# Patient Record
Sex: MALE | Race: ASIAN | NOT HISPANIC OR LATINO | URBAN - METROPOLITAN AREA
[De-identification: names, ages, dates, MRNs, and addresses within clinical notes are randomized per-mention and may not be internally consistent; named-entity substitution may affect disease eponyms.]

---

## 2017-03-13 ENCOUNTER — OUTPATIENT (OUTPATIENT)
Dept: OUTPATIENT SERVICES | Facility: HOSPITAL | Age: 82
LOS: 1 days | End: 2017-03-13
Payer: MEDICARE

## 2017-03-13 ENCOUNTER — APPOINTMENT (OUTPATIENT)
Dept: MRI IMAGING | Facility: IMAGING CENTER | Age: 82
End: 2017-03-13

## 2017-03-13 DIAGNOSIS — Z00.8 ENCOUNTER FOR OTHER GENERAL EXAMINATION: ICD-10-CM

## 2017-03-13 PROCEDURE — 70551 MRI BRAIN STEM W/O DYE: CPT

## 2017-03-17 ENCOUNTER — APPOINTMENT (OUTPATIENT)
Dept: MRI IMAGING | Facility: CLINIC | Age: 82
End: 2017-03-17

## 2019-12-11 ENCOUNTER — APPOINTMENT (OUTPATIENT)
Dept: SURGERY | Facility: CLINIC | Age: 84
End: 2019-12-11
Payer: COMMERCIAL

## 2019-12-11 VITALS
WEIGHT: 150 LBS | OXYGEN SATURATION: 95 % | TEMPERATURE: 98.3 F | BODY MASS INDEX: 24.99 KG/M2 | DIASTOLIC BLOOD PRESSURE: 53 MMHG | HEART RATE: 70 BPM | SYSTOLIC BLOOD PRESSURE: 165 MMHG | HEIGHT: 65 IN | RESPIRATION RATE: 16 BRPM

## 2019-12-11 DIAGNOSIS — I10 ESSENTIAL (PRIMARY) HYPERTENSION: ICD-10-CM

## 2019-12-11 DIAGNOSIS — Z93.3 COLOSTOMY STATUS: ICD-10-CM

## 2019-12-11 DIAGNOSIS — K59.00 CONSTIPATION, UNSPECIFIED: ICD-10-CM

## 2019-12-11 PROCEDURE — 99213 OFFICE O/P EST LOW 20 MIN: CPT

## 2019-12-11 NOTE — ASSESSMENT
[FreeTextEntry1] : Daily MiraLax recommended for constipation. Patient's perineal wound has difficulty healing because the patient sits for prolonged periods of time.  The wound is clean. The patient will sit on soft cushions and shift side to side. Silvadene cream for wound care. Followup p.r.n.

## 2019-12-11 NOTE — PHYSICAL EXAM
[FreeTextEntry1] : Ostomy normal in appearance. Perineum with 1.5 cm granulating clean wound. Surrounding skin unremarkable.

## 2020-08-03 ENCOUNTER — APPOINTMENT (OUTPATIENT)
Dept: GERIATRICS | Facility: CLINIC | Age: 85
End: 2020-08-03
Payer: MEDICARE

## 2020-08-03 VITALS — DIASTOLIC BLOOD PRESSURE: 60 MMHG | SYSTOLIC BLOOD PRESSURE: 130 MMHG

## 2020-08-03 VITALS
HEIGHT: 66 IN | DIASTOLIC BLOOD PRESSURE: 70 MMHG | OXYGEN SATURATION: 94 % | RESPIRATION RATE: 16 BRPM | WEIGHT: 155 LBS | SYSTOLIC BLOOD PRESSURE: 120 MMHG | HEART RATE: 56 BPM | BODY MASS INDEX: 24.91 KG/M2 | TEMPERATURE: 98.2 F

## 2020-08-03 DIAGNOSIS — Z23 ENCOUNTER FOR IMMUNIZATION: ICD-10-CM

## 2020-08-03 DIAGNOSIS — H91.13 PRESBYCUSIS, BILATERAL: ICD-10-CM

## 2020-08-03 DIAGNOSIS — Z83.3 FAMILY HISTORY OF DIABETES MELLITUS: ICD-10-CM

## 2020-08-03 DIAGNOSIS — M15.9 POLYOSTEOARTHRITIS, UNSPECIFIED: ICD-10-CM

## 2020-08-03 PROCEDURE — 99205 OFFICE O/P NEW HI 60 MIN: CPT

## 2020-08-03 PROCEDURE — 90732 PPSV23 VACC 2 YRS+ SUBQ/IM: CPT

## 2020-08-03 PROCEDURE — G0009: CPT

## 2020-08-03 NOTE — ASSESSMENT
[Designated Healthcare Proxy] : Designated healthcare proxy [Discussed at today's visit] : Advance Directives Discussed at today's visit [Name: ___] : Health Care Proxy's Name: [unfilled]  [Relationship: ___] : Relationship: [unfilled] [Daily physical exercise] : daily physical exercise [Remove loose items] : remove loose rugs or any other loose items that could lead to tripping and falling [Medication Review] : reviewed medications that increase the risk of falls [Adequate lighting] : provide adequate lighting and use a night light [Physical Therapy referral] : Physical Therapy referral [Use recommended devices] : use recommended assistive devices [Daily physical exercise as tolerated] : Daily physical exercise as tolerated [Minimize falling] : use grab bars, anti- slip rugs, and proper shoes to minimize falling [Normal Weight (BMI <25)] : normal weight - BMI <25 [Social support] : social support [Living arrangements] : living arrangements [Vaccines] : vaccines [Pain Management] : pain management [Advance Directives] : advance directives [Lab Results] : lab results [Medication Management] : medication management [FreeTextEntry4] : Patient prefers his daughter to be emergency contact at this time (she is living with them)\par Two sons and daughter\par \par Reports he does "not want a tube" or aggressive lifesaving measures. Reviewed MOLST and HCP forms in detail. Patietnt would prefer to further review w/ his children w/ plan to complete at next visit.

## 2020-08-03 NOTE — PHYSICAL EXAM
[General Appearance - Alert] : alert [General Appearance - Well Nourished] : well nourished [General Appearance - In No Acute Distress] : in no acute distress [General Appearance - Well-Appearing] : healthy appearing [General Appearance - Well Developed] : well developed [Sclera] : the sclera and conjunctiva were normal [PERRL With Normal Accommodation] : pupils were equal in size, round, and reactive to light [Extraocular Movements] : extraocular movements were intact [Normal Oral Mucosa] : normal oral mucosa [No Oral Cyanosis] : no oral cyanosis [No Oral Pallor] : no oral pallor [Outer Ear] : the ears and nose were normal in appearance [Both Tympanic Membranes Were Examined] : both tympanic membranes were normal [Neck Appearance] : the appearance of the neck was normal [Oropharynx] : The oropharynx was normal [Thyroid Diffuse Enlargement] : the thyroid was not enlarged [Neck Cervical Mass (___cm)] : no neck mass was observed [Jugular Venous Distention Increased] : there was no jugular-venous distention [Thyroid Nodule] : there were no palpable thyroid nodules [Auscultation Breath Sounds / Voice Sounds] : lungs were clear to auscultation bilaterally [Heart Rate And Rhythm] : heart rate was normal and rhythm regular [Heart Sounds] : normal S1 and S2 [Murmurs] : no murmurs [Full Pulse] : the pedal pulses are present [Bowel Sounds] : normal bowel sounds [Abdomen Tenderness] : non-tender [Abdomen Mass (___ Cm)] : no abdominal mass palpated [Abdomen Soft] : soft [Cervical Lymph Nodes Enlarged Posterior Bilaterally] : posterior cervical [Cervical Lymph Nodes Enlarged Anterior Bilaterally] : anterior cervical [No CVA Tenderness] : no ~M costovertebral angle tenderness [Motor Tone] : muscle strength and tone were normal [] : no rash [Skin Color & Pigmentation] : normal skin color and pigmentation [Sensation] : the sensory exam was normal to light touch and pinprick [Deep Tendon Reflexes (DTR)] : deep tendon reflexes were 2+ and symmetric [No Focal Deficits] : no focal deficits [Oriented To Time, Place, And Person] : oriented to person, place, and time [Memory Recent] : recent memory was not impaired [Impaired Insight] : insight and judgment were intact [Affect] : the affect was normal [FreeTextEntry1] : No symptoms when moving from seated to standing position

## 2020-08-03 NOTE — REVIEW OF SYSTEMS
[Feeling Tired] : feeling tired [As noted in HPI] : as noted in HPI [Heart Rate Is Slow] : slow heart rate [Loss Of Hearing] : hearing loss [Lower Ext Edema] : lower extremity edema [As Noted in HPI] : as noted in HPI [Dizziness] : dizziness [Difficulty Walking] : difficulty walking [Negative] : Heme/Lymph [Chills] : no chills [Fever] : no fever [Palpitations] : no palpitations [Chest Pain] : no chest pain [Earache] : no earache [Confused] : no confusion [Leg Claudication] : no intermittent leg claudication [Convulsions] : no convulsions [Fainting] : no fainting [Limb Weakness] : no limb weakness [FreeTextEntry5] : Long-standing LE edema. No leg elevation

## 2020-08-03 NOTE — REASON FOR VISIT
[Initial Evaluation] : an initial evaluation [Family Member] : family member [Source: ________] : History obtained from [unfilled]

## 2020-08-03 NOTE — CURRENT MEDS
[Medication Reconciliation Completed] : Medication reconciliation completed [High Risk Medications Reviewed] : High risk medications reviewed [Patient/caregiver able to verbalize understanding of medications, including indications and side effects] : Patient/caregiver able to verbalize understanding of medications, including indications and side effects [ at pharmacy] :  at local pharmacy [de-identified] : [facility] [] : does not miss any medication doses [Reports Changes In Medication (including OTC)] : Patient reports no changes in medication

## 2020-08-03 NOTE — SOCIAL HISTORY
[No falls in past year] : Patient reported no falls in the past year [Walker] : walker [Anti-Slip Measures] : anti-slip measures [Shower Chair] : shower chair [FreeTextEntry4] : No reported cognitive deficits [FreeTextEntry1] : Has 24/7 live-in A [FreeTextEntry2] : Lives w/ spouse and currently daughter. Wife has severe dementia.  [Grab Bars] : no grab bars [Guns at Home] : no guns at home [de-identified] : Assistance w/ bathing, dressing, transferring. Independent in toileting, feeding, walks w/ rollator [de-identified] : Full assistance  [de-identified] : Last fall two years ago [de-identified] : Rollator [de-identified] : Has stairlift (lives on second floor)

## 2020-08-03 NOTE — HISTORY OF PRESENT ILLNESS
[0] : 2) Feeling down, depressed, or hopeless: Not at all [FreeTextEntry1] : 89 y/o man w/ PMH BPH, DM2, HTN, CKD III, hypothyroidism, presbycusis, hearing loss, rectal cancer (in remission) s/p proctectomy w/ colostomy presents for initial visit. \par \par PCP: Dr. Joey Julian\par \par Patient reports some "dizziness" and "light-headedness" like he is "going to pass out". The episodes usually last a couple of minutes and are precipitated by moving from seating to standing position. It improves after he sits. His BP is measured regularly and has been "slightly lower recently", with SBP as low as 80-90s in the AM. He typically notes these episodes in the AM. He does not take antihypertensive medications. Has not been checked for orthostatics. \par \par His granddaughter does note increased fatigue and decreased PO intake over the past few months. He seems to have "less energy" than usual. He's always been active, including doing [employer] dancing into his late 80s and working for the [employer] for many years. His wife is severely demented and dependant for all ADL/IADLs.\par \par HTN: Currently not on medications. BP range 90s-140s/40s-50s. HR typically 50s-60s. Is on flomax.\par \par OA: Noted R knee pain intermittently, R>L. Pain sometimes impacts ability to ambulate. He previously received steroid injections to R knee, last in 2019. It did help. Has not used tylenol. Also notes some pain in L wrist w/ occasional numbness in first three fingers. Used L wrist splint w/o much relief. Uses pregabalin 100 mg qd. Used meloxicam in the past w/ intermittent relief. \par \par DM2: Last HbA1c 5.7 7/14/20. F/S at home ~.\par \par Hypothyroidism: Remains on synthroid. Last TSH 3.46 from 10/2019. \par \par BPH: Remains on flomax, myrbetriq and finasteride. No recent issues.\par \par Colostomy: No recent issues, patient manages bag himself. Occasional decreased output. \par \par HCM: Patient is on ASA and simvastatin. No h/o stents. Reports ?prior stroke, granddaughter cannot verify and no imaging in EMR. Need to clarify.\par \par Vaccinations:\par Flu - Last year\par Pneumovax - Has not had\par Tdap - Cannot remember last dose\par Zoster - Unsure

## 2020-08-06 LAB
25(OH)D3 SERPL-MCNC: 42.8 NG/ML
ANION GAP SERPL CALC-SCNC: 15 MMOL/L
BASOPHILS # BLD AUTO: 0.01 K/UL
BASOPHILS NFR BLD AUTO: 0.2 %
BUN SERPL-MCNC: 24 MG/DL
CALCIUM SERPL-MCNC: 9.4 MG/DL
CHLORIDE SERPL-SCNC: 106 MMOL/L
CO2 SERPL-SCNC: 21 MMOL/L
CREAT SERPL-MCNC: 1.28 MG/DL
EOSINOPHIL # BLD AUTO: 0.06 K/UL
EOSINOPHIL NFR BLD AUTO: 1.2 %
GLUCOSE SERPL-MCNC: 142 MG/DL
HCT VFR BLD CALC: 36.9 %
HGB BLD-MCNC: 11.7 G/DL
IMM GRANULOCYTES NFR BLD AUTO: 0 %
LYMPHOCYTES # BLD AUTO: 1.62 K/UL
LYMPHOCYTES NFR BLD AUTO: 32.6 %
MAN DIFF?: NORMAL
MCHC RBC-ENTMCNC: 31.7 GM/DL
MCHC RBC-ENTMCNC: 32.6 PG
MCV RBC AUTO: 102.8 FL
MONOCYTES # BLD AUTO: 0.6 K/UL
MONOCYTES NFR BLD AUTO: 12.1 %
NEUTROPHILS # BLD AUTO: 2.68 K/UL
NEUTROPHILS NFR BLD AUTO: 53.9 %
PLATELET # BLD AUTO: 153 K/UL
POTASSIUM SERPL-SCNC: 4.7 MMOL/L
RBC # BLD: 3.59 M/UL
RBC # FLD: 15 %
SODIUM SERPL-SCNC: 143 MMOL/L
TSH SERPL-ACNC: 1.91 UIU/ML
VIT B12 SERPL-MCNC: >2000 PG/ML
WBC # FLD AUTO: 4.97 K/UL

## 2020-09-02 ENCOUNTER — APPOINTMENT (OUTPATIENT)
Dept: GERIATRICS | Facility: CLINIC | Age: 85
End: 2020-09-02
Payer: MEDICARE

## 2020-09-02 VITALS
RESPIRATION RATE: 16 BRPM | WEIGHT: 156 LBS | BODY MASS INDEX: 25.18 KG/M2 | SYSTOLIC BLOOD PRESSURE: 130 MMHG | HEART RATE: 64 BPM | DIASTOLIC BLOOD PRESSURE: 64 MMHG | OXYGEN SATURATION: 95 %

## 2020-09-02 DIAGNOSIS — G56.02 CARPAL TUNNEL SYNDROME, LEFT UPPER LIMB: ICD-10-CM

## 2020-09-02 DIAGNOSIS — Z92.29 PERSONAL HISTORY OF OTHER DRUG THERAPY: ICD-10-CM

## 2020-09-02 DIAGNOSIS — E53.8 DEFICIENCY OF OTHER SPECIFIED B GROUP VITAMINS: ICD-10-CM

## 2020-09-02 DIAGNOSIS — T14.8XXA OTHER INJURY OF UNSPECIFIED BODY REGION, INITIAL ENCOUNTER: ICD-10-CM

## 2020-09-02 PROCEDURE — 99215 OFFICE O/P EST HI 40 MIN: CPT

## 2020-09-02 PROCEDURE — 99497 ADVNCD CARE PLAN 30 MIN: CPT | Mod: 25

## 2020-09-02 RX ORDER — MIRABEGRON 25 MG/1
25 TABLET, FILM COATED, EXTENDED RELEASE ORAL
Refills: 0 | Status: ACTIVE | COMMUNITY

## 2020-09-02 RX ORDER — ASPIRIN 81 MG
81 TABLET, DELAYED RELEASE (ENTERIC COATED) ORAL DAILY
Refills: 1 | Status: ACTIVE | COMMUNITY

## 2020-09-02 RX ORDER — TAMSULOSIN HYDROCHLORIDE 0.4 MG/1
0.4 CAPSULE ORAL
Qty: 30 | Refills: 2 | Status: DISCONTINUED | COMMUNITY
Start: 2020-08-06 | End: 2020-09-02

## 2020-09-02 RX ORDER — PREGABALIN 50 MG/1
50 CAPSULE ORAL
Refills: 0 | Status: DISCONTINUED | COMMUNITY
End: 2020-09-02

## 2020-09-02 RX ORDER — FINASTERIDE 5 MG/1
5 TABLET, FILM COATED ORAL DAILY
Refills: 0 | Status: ACTIVE | COMMUNITY

## 2020-09-02 RX ORDER — REPAGLINIDE 1 MG/1
1 TABLET ORAL 3 TIMES DAILY
Refills: 0 | Status: DISCONTINUED | COMMUNITY
End: 2020-09-02

## 2020-09-02 NOTE — REVIEW OF SYSTEMS
[Fever] : no fever [Chills] : no chills [Earache] : no earache [Palpitations] : no palpitations [Chest Pain] : no chest pain [Leg Claudication] : no intermittent leg claudication [Confused] : no confusion [Convulsions] : no convulsions [Fainting] : no fainting [Limb Weakness] : no limb weakness [FreeTextEntry5] : Long-standing LE edema. No leg elevation

## 2020-09-02 NOTE — CURRENT MEDS
[Reports Changes In Medication (including OTC)] : Patient reports no changes in medication [] : does not miss any medication doses [de-identified] : CEDRIC

## 2020-09-02 NOTE — ASSESSMENT
[FreeTextEntry1] : RTC in one month for flu vaccine.  [FreeTextEntry4] : Completed HCP and MOLST with patient today in the office. Appointed Flory as his HCP w/ son (Quinn Mohamud - [phone]) and granddaughter (Danica Garcia - [phone]) as alternates.\par \par MOLST patient is DNR/DNI, no feeding tube, limited med interventions, would want to be hospitalized and given ABX if needed.\par \par Face-to-face advanced care planning time 20 minutes.

## 2020-09-02 NOTE — SOCIAL HISTORY
[FreeTextEntry1] : Has 24/7 live-in A [FreeTextEntry2] : Lives w/ spouse and currently daughter. Wife has severe dementia.  [FreeTextEntry4] : No reported cognitive deficits [Grab Bars] : no grab bars [Guns at Home] : no guns at home [de-identified] : Last fall two years ago [de-identified] : Assistance w/ bathing, dressing, transferring due to functional impairment. Independent in toileting, feeding, walks w/ rollator [de-identified] : Rollator [de-identified] : Has stairlift (lives on second floor)

## 2020-09-02 NOTE — DATA REVIEWED
[FreeTextEntry1] : MRI BRAIN (2017):\par \par Mild microvascular ischemic change. Small chronic left thalamic, right \par centrum semiovale lacunar infarctions.\par No intracranial hemorrhage, mass or hydrocephalus.\par

## 2020-09-02 NOTE — HISTORY OF PRESENT ILLNESS
[FreeTextEntry1] : 89 y/o man w/ PMH BPH, DM2, HTN, CKD III, hypothyroidism, presbycusis, OA, L carpal tunnel syndrome, rectal cancer (in remission) s/p proctectomy w/ colostomy, and prior lacunar CVA presents for f/u visit. \par \par Orthostatic Hypotension: Seen last visit, no objective orthostasis in the office but suspected at home w/ reported borderline BP. Flomax was briefly held and then decreased from 0.8 to 0.4 mg, but patient had recurrence of urinary hesitancy. Increased fluid intake and has been taking precautions when moving from seated to standing positions at home. No reported dizziness in the past month. BP measurements range 100s-140s/40s-70s.\par \par Dizziness: Improving. Reviewed TTE (7/2020) sent from Dr. Julian's office, EF 55-60% w/o significant valvular abnormalities. Recent Holter (7/2020) w/ intermittent sinus bradycardia w/ average HR 56 bpm (max 68) w/ 16 APCs but no evidence of pauses or arrhythmia. \par \par HTN: Currently not on medications.\par \par OA/Carpal Tunnel: Noted R knee pain intermittently, R>L. Pain sometimes impacts ability to ambulate. He previously received steroid injections to R knee, last in 2019. It did help. Also notes some pain in L wrist w/ occasional numbness in first three fingers. Uses L wrist splint w/o much relief. Uses pregabalin, cut to 50 mg on last visit. Encouraged use of tylenol, which he is using intermittently. Pregabalin continues to make him fatigued w/o significant relief. Not interested in surgical evaluation for carpal tunnel. Does not have weakness of his L hand.\par \par DM2: Last HbA1c 5.7 7/14/20. Lantus was d/c'ed last visit, remains on prandin (had been decreased from 2 to 1 mg) and Janumet. His F/S in the past month have ranged from 90s-170s. No hypoglycemia. Weight is stable.\par \par Hypothyroidism: Remains on synthroid alternating doses 50/75 mcg. Last TSH ~2 8/2020.\par \par BPH: Remains on flomax, myrbetriq and finasteride. Increased urinary hesitancy when flomax dose was held and on 0.4 mg qhs. Has been on 0.8 mg qhs last two weeks w/o dizziness.\par \par Colostomy: No recent issues, patient manages bag himself. \par \par Lacunar CVA: Incidental finding on MRI in 2017 w/ L thalamic chronic lacunar CVA. Remains on ASA and simvastatin. \par \par Macrocytic Anemia: H/H stable, Hgb steady ~11-12. CBC in 2012 noted w/ Hgb 10.8 w/ , similar to recent CBC.\par \par CKD III: Last Cr 1.28 8/2020, at baseline.\par \par HCM: Patient is on ASA and simvastatin. No h/o stents. \par \par Vaccinations:\par Flu - Last year\par Pneumovax - 8/2020\par Tdap - Cannot remember last dose\par Zoster - Unsure\par \par VS: 130/64, 64, 16, 95% on RA

## 2020-09-02 NOTE — REASON FOR VISIT
[Intercurrent Specialty/Sub-specialty Visits] : the patient has no intercurrent specialty/sub-specialty visits

## 2020-12-07 ENCOUNTER — APPOINTMENT (OUTPATIENT)
Dept: GERIATRICS | Facility: CLINIC | Age: 85
End: 2020-12-07
Payer: MEDICARE

## 2020-12-07 DIAGNOSIS — E03.9 HYPOTHYROIDISM, UNSPECIFIED: ICD-10-CM

## 2020-12-07 DIAGNOSIS — I95.1 ORTHOSTATIC HYPOTENSION: ICD-10-CM

## 2020-12-07 PROCEDURE — 99214 OFFICE O/P EST MOD 30 MIN: CPT | Mod: 95

## 2020-12-07 RX ORDER — TAMSULOSIN HYDROCHLORIDE 0.4 MG/1
0.4 CAPSULE ORAL
Qty: 30 | Refills: 2 | Status: ACTIVE | COMMUNITY
Start: 2020-12-07

## 2020-12-07 RX ORDER — METFORMIN HYDROCHLORIDE 1000 MG/1
1000 TABLET, COATED ORAL
Qty: 180 | Refills: 2 | Status: ACTIVE | COMMUNITY
Start: 2020-12-07 | End: 1900-01-01

## 2020-12-07 RX ORDER — TAMSULOSIN HYDROCHLORIDE 0.4 MG/1
0.4 CAPSULE ORAL
Qty: 30 | Refills: 0 | Status: DISCONTINUED | COMMUNITY
Start: 2020-09-02 | End: 2020-12-07

## 2020-12-07 RX ORDER — SITAGLIPTIN AND METFORMIN HYDROCHLORIDE 50; 500 MG/1; MG/1
50-500 TABLET, FILM COATED ORAL DAILY
Refills: 0 | Status: DISCONTINUED | COMMUNITY
End: 2020-12-07

## 2020-12-07 NOTE — PHYSICAL EXAM
[General Appearance - Alert] : alert [General Appearance - In No Acute Distress] : in no acute distress [General Appearance - Well-Appearing] : healthy appearing [] : no respiratory distress [Exaggerated Use Of Accessory Muscles For Inspiration] : no accessory muscle use [Oriented To Time, Place, And Person] : oriented to person, place, and time [Affect] : the affect was normal [Mood] : the mood was normal

## 2020-12-07 NOTE — HISTORY OF PRESENT ILLNESS
[Home] : at home, [unfilled] , at the time of the visit. [Medical Office: ([facility])___] : at the medical office located in  [Verbal consent obtained from patient] : the patient, [unfilled] [FreeTextEntry1] : 89 y/o man w/ PMH BPH, DM2, HTN, CKD III, hypothyroidism, presbycusis, OA, L carpal tunnel syndrome, rectal cancer (in remission) s/p proctectomy w/ colostomy, and prior lacunar CVA presents for f/u telemedicine visit. \par \par Endocrinology: Dr. Sheikh \par \par Orthostatic Hypotension: Seen last visit, no objective orthostasis in the office but suspected at home w/ reported borderline BP. Flomax was briefly held and then decreased from 0.8 to 0.4 mg, but patient had recurrence of urinary hesitancy. Increased fluid intake and has been taking precautions when moving from seated to standing positions at home. Urology decreased flomax back to 0.4 mg on recent visit. His urinary sx have actually been ok past month. BP measurements range 100s-140s/40s-70s.\par \par Dizziness: Mild improvement. Reviewed TTE (7/2020) sent from Dr. Julian's office, EF 55-60% w/o significant valvular abnormalities. Holter (7/2020) w/ intermittent sinus bradycardia w/ average HR 56 bpm (max 68) w/ 16 APCs but no evidence of pauses or arrhythmia. Last had an episode of dizziness 10//7/2020 was also a/w borderline hypotension. \par \par HTN: Currently not on medications. Previously on losartan.\par \par OA/Carpal Tunnel: Noted R knee pain intermittently, R>L. Pain sometimes impacts ability to ambulate. He previously received steroid injections to R knee, last in 2019. It did help. Also notes some pain in L wrist w/ occasional numbness in first three fingers. Now off pregabalin. Not interested in surgical evaluation for carpal tunnel. Does not have weakness of his L hand.\par \par DM2: Last HbA1c 5.7 7/14/20. Lantus was d/c'ed, prandin was d/c'ed on last visit. Remains Janumet. His F/S in the past month have ranged from 90s-170s. No hypoglycemia. Weight is stable. He saw an endocrinologist last month who started Acarbose TID. Daughter is concerned about pill burden. \par \par Hypothyroidism: Remains on synthroid alternating doses 50/75 mcg. Last TSH ~2 8/2020.\par \par BPH: Remains on flomax, myrbetriq and finasteride. Sx stable. \par \par Colostomy: No recent issues, patient manages bag w/ daughter. \par \par Lacunar CVA: Incidental finding on MRI in 2017 w/ L thalamic chronic lacunar CVA. Remains on ASA and simvastatin. \par \par Macrocytic Anemia: H/H stable, Hgb steady ~11-12. CBC in 2012 noted w/ Hgb 10.8 w/ , similar to recent CBC.\par \par CKD III: Last Cr 1.28 8/2020, at baseline.\par \par HCM: Patient is on ASA and simvastatin. No h/o stents. \par \par Vaccinations:\par Flu - Last year\par Pneumovax - 8/2020\par Tdap - Cannot remember last dose\par Zoster - Unsure

## 2020-12-07 NOTE — CURRENT MEDS
[Medication Reconciliation Completed] : Medication reconciliation completed [High Risk Medications Reviewed] : High risk medications reviewed [Patient/caregiver able to verbalize understanding of medications, including indications and side effects] : Patient/caregiver able to verbalize understanding of medications, including indications and side effects [ at pharmacy] :  at local pharmacy [Reports Changes In Medication (including OTC)] : Patient reports no changes in medication [] : does not miss any medication doses [de-identified] : CEDRIC

## 2020-12-07 NOTE — ASSESSMENT
[FreeTextEntry1] : Daughter inquired about "carotid ultrasound" for his occasional dizziness. Explained that he could have some evidence of carotid stenosis, but more likely that these episodes are due to orthostasis. He does have h/o TIA. Even if he had significant carotid stenosis, patient would be poor candidate for intervention and family would not want to pursue invasive measures. Daughter confirmed this again today. We discussed that imaging would not be appropriate if we will not make any changes to management based on the results. She is in agreement w/ patient. For now, no further imaging and can monitor for any changes in symptoms.\par \par Counseled to RTC in 1month.

## 2020-12-07 NOTE — SOCIAL HISTORY
[No falls in past year] : Patient reported no falls in the past year [Walker] : walker [Shower Chair] : shower chair [Anti-Slip Measures] : anti-slip measures [FreeTextEntry1] : Has 24/7 live-in A [FreeTextEntry2] : Lives w/ spouse and currently daughter. Wife has severe dementia.  [FreeTextEntry4] : No reported cognitive deficits [Guns at Home] : no guns at home [Grab Bars] : no grab bars [de-identified] : Last fall two years ago [de-identified] : Assistance w/ bathing, dressing, transferring due to functional impairment. Independent in toileting, feeding, walks w/ rollator [de-identified] : Rollator [de-identified] : Has stairlift (lives on second floor)

## 2020-12-07 NOTE — REVIEW OF SYSTEMS
[Feeling Tired] : feeling tired [Loss Of Hearing] : hearing loss [As noted in HPI] : as noted in HPI [As Noted in HPI] : as noted in HPI [Dizziness] : dizziness [Difficulty Walking] : difficulty walking [Negative] : Heme/Lymph [Fever] : no fever [Chills] : no chills [Earache] : no earache [Heart Rate Is Slow] : the heart rate was not slow [Chest Pain] : no chest pain [Palpitations] : no palpitations [Leg Claudication] : no intermittent leg claudication [Confused] : no confusion [Convulsions] : no convulsions [Fainting] : no fainting [Limb Weakness] : no limb weakness [FreeTextEntry5] : Long-standing LE edema. No leg elevation

## 2021-01-14 ENCOUNTER — APPOINTMENT (OUTPATIENT)
Dept: GERIATRICS | Facility: CLINIC | Age: 86
End: 2021-01-14
Payer: MEDICARE

## 2021-01-14 DIAGNOSIS — R63.8 OTHER SYMPTOMS AND SIGNS CONCERNING FOOD AND FLUID INTAKE: ICD-10-CM

## 2021-01-14 DIAGNOSIS — Z87.438 PERSONAL HISTORY OF OTHER DISEASES OF MALE GENITAL ORGANS: ICD-10-CM

## 2021-01-14 DIAGNOSIS — R53.83 OTHER FATIGUE: ICD-10-CM

## 2021-01-14 DIAGNOSIS — Z86.73 PERSONAL HISTORY OF TRANSIENT ISCHEMIC ATTACK (TIA), AND CEREBRAL INFARCTION W/OUT RESIDUAL DEFICITS: ICD-10-CM

## 2021-01-14 PROCEDURE — 99213 OFFICE O/P EST LOW 20 MIN: CPT | Mod: 95

## 2021-01-14 RX ORDER — SIMVASTATIN 5 MG/1
5 TABLET, FILM COATED ORAL
Refills: 0 | Status: DISCONTINUED | COMMUNITY
End: 2021-01-14

## 2021-01-14 NOTE — REVIEW OF SYSTEMS
[Feeling Tired] : feeling tired [Loss Of Hearing] : hearing loss [As noted in HPI] : as noted in HPI [As Noted in HPI] : as noted in HPI [Difficulty Walking] : difficulty walking [Negative] : Heme/Lymph [Fever] : no fever [Chills] : no chills [Earache] : no earache [Heart Rate Is Slow] : the heart rate was not slow [Chest Pain] : no chest pain [Palpitations] : no palpitations [Leg Claudication] : no intermittent leg claudication [Confused] : no confusion [Convulsions] : no convulsions [Dizziness] : no dizziness [Fainting] : no fainting [Limb Weakness] : no limb weakness [FreeTextEntry5] : Long-standing LE edema. No leg elevation

## 2021-01-14 NOTE — CURRENT MEDS
[Medication Reconciliation Completed] : Medication reconciliation completed [High Risk Medications Reviewed] : High risk medications reviewed [Patient/caregiver able to verbalize understanding of medications, including indications and side effects] : Patient/caregiver able to verbalize understanding of medications, including indications and side effects [ at pharmacy] :  at local pharmacy [Reports Changes In Medication (including OTC)] : Patient reports no changes in medication [] : does not miss any medication doses [de-identified] : CEDRIC

## 2021-01-14 NOTE — SOCIAL HISTORY
[No falls in past year] : Patient reported no falls in the past year [Walker] : walker [Shower Chair] : shower chair [Anti-Slip Measures] : anti-slip measures [FreeTextEntry1] : Has 24/7 live-in A [FreeTextEntry2] : Lives w/ spouse and currently daughter. Wife has severe dementia.  [FreeTextEntry4] : No reported cognitive deficits [Guns at Home] : no guns at home [Grab Bars] : no grab bars [de-identified] : Last fall two years ago [de-identified] : Assistance w/ bathing, dressing, transferring due to functional impairment. Independent in toileting, feeding, walks w/ rollator [de-identified] : Rollator [de-identified] : Has stairlift (lives on second floor)

## 2021-01-14 NOTE — HISTORY OF PRESENT ILLNESS
[Home] : at home, [unfilled] , at the time of the visit. [Medical Office: ([facility])___] : at the medical office located in  [Family Member] : family member [FreeTextEntry3] : Flory, daughter [FreeTextEntry1] : 89 y/o man w/ PMH BPH, DM2, HTN, CKD III, hypothyroidism, presbycusis, OA, L carpal tunnel syndrome, rectal cancer (in remission) s/p proctectomy w/ colostomy, and prior lacunar CVA presents for f/u telemedicine visit. \par \par Endocrinology: Dr. Sheikh \par \par Patient's daughter reports he recently feels "thirsty," especially at night. He wakes up to take drinks of water. He feels drowsy during the day because of this. He has not had hyperglycemia. Daughter notes he has no symptoms of thirst during the day. He is not drinking much water during the day. Usually drinks tea or coffee, does not drink much water during the day. He tells his daughter he is 'not thirsty." ROS is otherwise unremarkable. Daughter states he is actually doing quite "well" and has minimal complaints. \par \par Orthostatic Hypotension: BP measurements range 100s-140s/40s-70s. He has has NO episodes \par \par Dizziness: TTE (7/2020) from Dr. Julian's office, EF 55-60% w/o significant valvular abnormalities. Holter (7/2020) w/ intermittent sinus bradycardia w/ average HR 56 bpm (max 68) w/ 16 APCs but no evidence of pauses or arrhythmia. Has had no episodes of dizziness in the past month. \par \par HTN: Currently not on medications. Previously on losartan.\par \par OA/Carpal Tunnel: Noted R knee pain intermittently, R>L. Pain sometimes impacts ability to ambulate. He previously received steroid injections to R knee, last in 2019. It did help. Also notes some pain in L wrist w/ occasional numbness in first three fingers. Now off pregabalin. Not interested in surgical evaluation for carpal tunnel. Does not have weakness of his L hand.\par \par DM2: Last HbA1c 5.7 7/14/20. Lantus was d/c'ed, prandin was d/c'ed on last visit. Now only on metformin. His F/S in the past month have ranged ~160s-180s. No hypoglycemia. Weight is stable. He saw an endocrinologist last month who started Acarbose TID. Daughter is concerned about pill burden. \par \par Hypothyroidism: Remains on synthroid alternating doses 50/75 mcg. Last TSH ~2 8/2020.\par \par BPH: Remains on flomax, myrbetriq and finasteride. Sx stable. \par \par Colostomy: No recent issues, patient manages bag w/ daughter. \par \par Lacunar CVA: Incidental finding on MRI in 2017 w/ L thalamic chronic lacunar CVA. Remains on ASA and simvastatin. \par \par Macrocytic Anemia: H/H stable, Hgb steady ~11-12. CBC in 2012 noted w/ Hgb 10.8 w/ , similar to recent CBC.\par \par CKD III: Last Cr 1.28 8/2020, at baseline.\par \par HCM: Patient is on ASA and simvastatin. No h/o stents. \par \par Vaccinations:\par COVID: Scheduled for 1/17/21 in [place]\par Flu - Last year\par Pneumovax - 8/2020\par Tdap - Cannot remember last dose\par Zoster - Unsure

## 2021-05-13 ENCOUNTER — APPOINTMENT (OUTPATIENT)
Dept: CARDIOLOGY | Facility: CLINIC | Age: 86
End: 2021-05-13
Payer: MEDICARE

## 2021-05-13 ENCOUNTER — NON-APPOINTMENT (OUTPATIENT)
Age: 86
End: 2021-05-13

## 2021-05-13 VITALS
HEART RATE: 71 BPM | OXYGEN SATURATION: 96 % | SYSTOLIC BLOOD PRESSURE: 109 MMHG | RESPIRATION RATE: 18 BRPM | TEMPERATURE: 97.5 F | DIASTOLIC BLOOD PRESSURE: 63 MMHG

## 2021-05-13 DIAGNOSIS — R55 SYNCOPE AND COLLAPSE: ICD-10-CM

## 2021-05-13 DIAGNOSIS — I10 ESSENTIAL (PRIMARY) HYPERTENSION: ICD-10-CM

## 2021-05-13 PROCEDURE — 93000 ELECTROCARDIOGRAM COMPLETE: CPT

## 2021-05-13 PROCEDURE — 99072 ADDL SUPL MATRL&STAF TM PHE: CPT

## 2021-05-13 PROCEDURE — 99204 OFFICE O/P NEW MOD 45 MIN: CPT

## 2021-05-25 PROBLEM — R55 SYNCOPE: Status: ACTIVE | Noted: 2021-05-13

## 2021-05-25 PROBLEM — I10 HTN (HYPERTENSION): Status: ACTIVE | Noted: 2021-05-25

## 2021-05-31 ENCOUNTER — FORM ENCOUNTER (OUTPATIENT)
Age: 86
End: 2021-05-31

## 2021-06-02 ENCOUNTER — NON-APPOINTMENT (OUTPATIENT)
Age: 86
End: 2021-06-02

## 2021-08-18 NOTE — PHYSICAL EXAM
[Well Developed] : well developed [Well Nourished] : well nourished [No Acute Distress] : no acute distress [Normal Conjunctiva] : normal conjunctiva [No Carotid Bruit] : no carotid bruit [Normal Venous Pressure] : normal venous pressure [Normal S1, S2] : normal S1, S2 [No Murmur] : no murmur [No Rub] : no rub [Clear Lung Fields] : clear lung fields [No Gallop] : no gallop [Good Air Entry] : good air entry [No Respiratory Distress] : no respiratory distress  [Soft] : abdomen soft [Non Tender] : non-tender [No Masses/organomegaly] : no masses/organomegaly [Normal Bowel Sounds] : normal bowel sounds [Normal Gait] : normal gait [No Edema] : no edema [No Cyanosis] : no cyanosis [No Clubbing] : no clubbing [No Varicosities] : no varicosities [Moves all extremities] : moves all extremities [No Focal Deficits] : no focal deficits [Normal Speech] : normal speech [Alert and Oriented] : alert and oriented [Normal memory] : normal memory

## 2021-08-18 NOTE — HISTORY OF PRESENT ILLNESS
[FreeTextEntry1] : 89 year old male with PMH o HTN, DM, CKD, BPH, and Rectal Ca s/p proctectomy and Colostomy present for evaluation of syncope. Pt has been having episodes of syncope for a few years. Last year, he had 4 episodes. Holter was done in Dr. Julian's office last year, average HR 56, rare PACs, and no pause. He had 2 episodes this year. Last episode was 2 days ago. He felt nausea, and then passed out for  around 1-2 minutes. Family observed paleness and eyes rolling up. BP at that time was 227/102. It usually occurs after he gets up from sleep/nap. He was sent to [facility] ER and ruled out of stroke reportedly. Pt recently found to have + H. Pylori and he is taking antibiotics. His BP is 145-125-110.  I advised patient to wear 2-week  Event Holter monitor.

## 2021-09-03 ENCOUNTER — RX RENEWAL (OUTPATIENT)
Age: 86
End: 2021-09-03

## 2021-10-06 PROBLEM — I10 ESSENTIAL HYPERTENSION: Status: ACTIVE | Noted: 2019-12-11

## 2021-10-15 ENCOUNTER — NON-APPOINTMENT (OUTPATIENT)
Age: 86
End: 2021-10-15

## 2021-12-27 ENCOUNTER — APPOINTMENT (OUTPATIENT)
Dept: GERIATRICS | Facility: CLINIC | Age: 86
End: 2021-12-27

## 2022-01-20 ENCOUNTER — APPOINTMENT (OUTPATIENT)
Dept: GERIATRICS | Facility: CLINIC | Age: 87
End: 2022-01-20
Payer: MEDICARE

## 2022-01-20 DIAGNOSIS — R44.3 HALLUCINATIONS, UNSPECIFIED: ICD-10-CM

## 2022-01-20 DIAGNOSIS — R41.0 DISORIENTATION, UNSPECIFIED: ICD-10-CM

## 2022-01-20 DIAGNOSIS — R53.81 OTHER MALAISE: ICD-10-CM

## 2022-01-20 PROCEDURE — 99215 OFFICE O/P EST HI 40 MIN: CPT | Mod: 95

## 2022-01-20 NOTE — ASSESSMENT
[FreeTextEntry1] : The visit is limited by telemedicine. Unable to examine patient fully to discern abdominal pain, pulmonary findings, neuro exam. I also have no context for how patient has declined in the past year, having not seen him in our office for over 16 months. It appears he has had progressive functional decline and has likely superimposed delirium in the past couple of weeks.\par \par I explained clearly to daughter/HCP, Flory, that the differential diagnosis is vast, and can include infection, malignancy, stroke, in addition to other conditions. She inquired if he had "Alzheimers" which I responded it is impossible for me to make that determination via this visit. \par \par I conveyed my concern for Mr. Mohamud's overall prognosis. Given his described acute on chronic decline, I suspect his prognosis is likely days to weeks with no intervention. We discussed goals for Mr. Mohamud via his HCP, Flory. Flory asked if we could do labs in the home. I counseled that if we have goal to treat and identify reversible causes, he should be seen urgently in person, preferably in an ER, where he could have prompt workup and potential diagnosis. I also counseled that there is a risk of ER/hospitalization, including nosocomial infection and further functional and cognitive decline. If Flory and family decide to manage patient conservatively, he would likely be hospice appropriate. \par \par I provided Flory the number for [facility] for transport.

## 2022-01-20 NOTE — HISTORY OF PRESENT ILLNESS
[With Patient/Caregiver] : , with patient/caregiver [Reviewed no changes] : Reviewed, no changes [Designated Healthcare Proxy] : Designated healthcare proxy [Name: ___] : Health Care Proxy's Name: [unfilled]  [Relationship: ___] : Relationship: [unfilled] [Home] : at home, [unfilled] , at the time of the visit. [Medical Office: ([facility])___] : at the medical office located in  [Family Member] : family member [FreeTextEntry3] : daughter, sarina [FreeTextEntry1] : 89 y/o man w/ PMH BPH, DM2, HTN, CKD III, hypothyroidism, presbycusis, OA, L carpal tunnel syndrome, rectal cancer (in remission) s/p proctectomy w/ colostomy, and prior lacunar CVA presents for acute telemedicine visit. \par \par Patient has not been seen in the office for 16 months. He was only seen twice. Last telemed visit with me was over one year ago. In the interim, appears they have been following w/ Dr. Mohamud, a cardiologist, and last telephone encounter from 10/2021 noted patient was progressively weaker. \par \par All history obtained from patient's daughter.\par \par She states that patient is unable to get out of bed for the past 2 weeks. She states he has been hallucinating for the past couple of weeks. He is not "speaking clearly" and his "mind is not clear." He has not been able to walk. Reportedly tested negative for COVID twice.\par \par Daughter reports he is eating well. He has not been visibly short of breath. He has been unable to provide them with any other history. She was unable to obtain any vital signs during this visit.  [AdvancecareDate] : 09/20

## 2022-01-20 NOTE — PHYSICAL EXAM
[No Respiratory Distress] : no respiratory distress [de-identified] : frail, older man lying in bed. eyes open, but not regularly responding to question

## 2022-07-21 ENCOUNTER — APPOINTMENT (OUTPATIENT)
Dept: GERIATRICS | Facility: CLINIC | Age: 87
End: 2022-07-21

## 2022-07-21 VITALS
HEIGHT: 66 IN | WEIGHT: 136 LBS | DIASTOLIC BLOOD PRESSURE: 82 MMHG | OXYGEN SATURATION: 99 % | SYSTOLIC BLOOD PRESSURE: 142 MMHG | HEART RATE: 78 BPM | RESPIRATION RATE: 16 BRPM | BODY MASS INDEX: 21.86 KG/M2 | TEMPERATURE: 97.7 F

## 2022-07-21 DIAGNOSIS — N18.30 TYPE 2 DIABETES MELLITUS WITH DIABETIC CHRONIC KIDNEY DISEASE: ICD-10-CM

## 2022-07-21 DIAGNOSIS — E78.5 HYPERLIPIDEMIA, UNSPECIFIED: ICD-10-CM

## 2022-07-21 DIAGNOSIS — F03.90 UNSPECIFIED DEMENTIA W/OUT BEHAVIORAL DISTURBANCE: ICD-10-CM

## 2022-07-21 DIAGNOSIS — E11.22 TYPE 2 DIABETES MELLITUS WITH DIABETIC CHRONIC KIDNEY DISEASE: ICD-10-CM

## 2022-07-21 DIAGNOSIS — E11.9 TYPE 2 DIABETES MELLITUS W/OUT COMPLICATIONS: ICD-10-CM

## 2022-07-21 PROCEDURE — 99215 OFFICE O/P EST HI 40 MIN: CPT

## 2022-07-21 RX ORDER — TRAZODONE HYDROCHLORIDE 50 MG/1
50 TABLET ORAL
Qty: 90 | Refills: 1 | Status: ACTIVE | COMMUNITY
Start: 2022-07-16

## 2022-07-21 RX ORDER — SILVER SULFADIAZINE 10 MG/G
1 CREAM TOPICAL TWICE DAILY
Qty: 1 | Refills: 4 | Status: DISCONTINUED | COMMUNITY
Start: 2019-12-11 | End: 2022-07-21

## 2022-07-21 NOTE — ASSESSMENT
[FreeTextEntry1] : Patient reportedly dx w/ dementia. I asked for granddaughter to send records. No cognitive assessment can be completed. He has had agitation. Significant weight loss. Needs assistance w/ all IADLs and most ADLs. \par \par Granddaughter Danica (an OBGYN physician) states family would like to pursue hospice. This is not unreasonable given current condition. We will check labs. Asked for outside records.\par \par Need short term f/u visit w/ HCP present (if there is one appointed, we need to see the documentation). If no HCP, we need all children on f/u visit to agree to hospice referral. In the interim, MARY Cooney met w/ family today and provided agency information to hire additional HHA given caregiver burden.\par \par I reviewed medication list. If I am taking over care (unclear, as he apparently has seen multiple providers in [place] past 2 years) I would favor simplifying medication list given prognosis and goals that are reported.

## 2022-07-21 NOTE — PHYSICAL EXAM
[No Respiratory Distress] : no respiratory distress [Auscultation Breath Sounds / Voice Sounds] : lungs were clear to auscultation bilaterally [Oriented to Person] : oriented to person [Normal Gait] : abnormal gait [Oriented to Place] : disoriented to place [Oriented to Time] : disoriented to time [de-identified] : poor dentition [de-identified] : sitting in wheelchair, minimal coherent speech, requires assistance for all transfers [de-identified] : no sacral wounds

## 2022-07-22 LAB
ALBUMIN SERPL ELPH-MCNC: 4.1 G/DL
ALP BLD-CCNC: 63 U/L
ALT SERPL-CCNC: 8 U/L
ANION GAP SERPL CALC-SCNC: 14 MMOL/L
AST SERPL-CCNC: 14 U/L
BASOPHILS # BLD AUTO: 0.01 K/UL
BASOPHILS NFR BLD AUTO: 0.2 %
BILIRUB SERPL-MCNC: 0.2 MG/DL
BUN SERPL-MCNC: 26 MG/DL
CALCIUM SERPL-MCNC: 10 MG/DL
CHLORIDE SERPL-SCNC: 101 MMOL/L
CO2 SERPL-SCNC: 22 MMOL/L
CREAT SERPL-MCNC: 0.87 MG/DL
EGFR: 78 ML/MIN/1.73M2
EOSINOPHIL # BLD AUTO: 0 K/UL
EOSINOPHIL NFR BLD AUTO: 0 %
GLUCOSE SERPL-MCNC: 124 MG/DL
HCT VFR BLD CALC: 36.3 %
HGB BLD-MCNC: 12.6 G/DL
IMM GRANULOCYTES NFR BLD AUTO: 0.5 %
LYMPHOCYTES # BLD AUTO: 1.05 K/UL
LYMPHOCYTES NFR BLD AUTO: 16 %
MAN DIFF?: NORMAL
MCHC RBC-ENTMCNC: 33.4 PG
MCHC RBC-ENTMCNC: 34.7 GM/DL
MCV RBC AUTO: 96.3 FL
MONOCYTES # BLD AUTO: 0.75 K/UL
MONOCYTES NFR BLD AUTO: 11.4 %
NEUTROPHILS # BLD AUTO: 4.74 K/UL
NEUTROPHILS NFR BLD AUTO: 71.9 %
PLATELET # BLD AUTO: 225 K/UL
POTASSIUM SERPL-SCNC: 5 MMOL/L
PROT SERPL-MCNC: 6.3 G/DL
RBC # BLD: 3.77 M/UL
RBC # FLD: 14.4 %
SODIUM SERPL-SCNC: 137 MMOL/L
TSH SERPL-ACNC: 2.77 UIU/ML
WBC # FLD AUTO: 6.58 K/UL

## 2022-07-28 ENCOUNTER — APPOINTMENT (OUTPATIENT)
Dept: GERIATRICS | Facility: CLINIC | Age: 87
End: 2022-07-28

## 2022-07-28 DIAGNOSIS — Z71.89 OTHER SPECIFIED COUNSELING: ICD-10-CM

## 2022-07-28 DIAGNOSIS — F03.C0 UNSPECIFIED DEMENTIA, SEVERE, WITHOUT BEHAVIORAL DISTURBANCE, PSYCHOTIC DISTURBANCE, MOOD DISTURBANCE, AND ANXIETY: ICD-10-CM

## 2022-07-28 DIAGNOSIS — R63.4 ABNORMAL WEIGHT LOSS: ICD-10-CM

## 2022-07-28 PROCEDURE — 99497 ADVNCD CARE PLAN 30 MIN: CPT | Mod: 25,95

## 2022-07-28 PROCEDURE — 99214 OFFICE O/P EST MOD 30 MIN: CPT | Mod: 25,95

## 2022-07-28 RX ORDER — SIMVASTATIN 5 MG/1
5 TABLET, FILM COATED ORAL
Qty: 30 | Refills: 3 | Status: DISCONTINUED | COMMUNITY
Start: 2022-07-21 | End: 2022-07-28

## 2022-07-28 RX ORDER — ACETAMINOPHEN/DIPHENHYDRAMINE 500MG-25MG
1000 TABLET ORAL DAILY
Qty: 30 | Refills: 0 | Status: ACTIVE | COMMUNITY
Start: 2022-07-28

## 2022-07-28 NOTE — ASSESSMENT
[FreeTextEntry1] : Extensive discussion today regarding prognosis and goals. I shared that patient's prognosis is likely months. He has advanced dementia and is currently dependent on all ADLs, including assistance with feeding. Goals for patient are conservative, favor no further lab draws and minimizing physician visits. Goals are to remain home with family. They are interested in pursuing hospice, which is appropriate. \par \par MOLST has previously been completed, patient DNR/DNI. Case was d/w patient's HCP, Venkatesh.\par \par advance care planning time 16 minutes. \par \par D/C statin today. Counseled reasonable to also d/c metformin given sugar is ok. Counseled we expect dysphagia to progress; at some point he will likely be unable to swallow any pills.\par \par Daughter and granddaughter are in agreement with plan. Rec short term f/u as needed. Hospice referral will be made.

## 2022-07-28 NOTE — PHYSICAL EXAM
[No Respiratory Distress] : no respiratory distress [de-identified] : lying in bed, not responding to questions

## 2022-07-28 NOTE — HISTORY OF PRESENT ILLNESS
[Home] : at home, [unfilled] , at the time of the visit. [Medical Office: ([facility])___] : at the medical office located in  [Family Member] : family member [Reviewed no changes] : Reviewed, no changes [Designated Healthcare Proxy] : Designated healthcare proxy [Name: ___] : Health Care Proxy's Name: [unfilled]  [Relationship: ___] : Relationship: [unfilled] [DNR] : DNR [Last Verification Date: ___] : Last Verification Date: [unfilled] [I will adhere to the patient's wishes.] : I will adhere to the patient's wishes. [Time Spent: ___ minutes] : Time Spent: [unfilled] minutes [FreeTextEntry3] : daughter Flory [FreeTextEntry1] : 89 y/o man w/ PMH BPH, DM2, HTN, CKD III, hypothyroidism, presbycusis, OA, L carpal tunnel syndrome, rectal cancer (in remission) s/p proctectomy w/ colostomy, B12 deficiency, and prior lacunar CVA presents for f/u telemed visit.\par \par Cards: Dr. Mohamud\par Neuropsych?: Dr. Vasquez\par \par Last week patient had first visit in nearly 2 years, last 2020. He has had a couple phone visits in the interim. He has been receiving care from a variety of doctors in [place], as per granddaughter, most recently Dr. Vasquez.\par \par In the interim 2 years, his wife  ~1.5 years ago. He was reportedly diagnosed with dementia in the interim. He is no longer ambulating. He has lost 20 pounds in the last two years. He spends most of the time in bed. He is now dependent on all ADLs. A has been feeding him. \par \par Has had agitation, often tries to climb out of bed. Sometimes squeezes arms and hands of caregivers. Trazodone has been effective in mitigating behaviors. States they did not tolerate seroquel after one dose. \par \par Patient's daughter, Flory, states that her mother was on hospice through [facility]. Notes she had positive experience. [AdvancecareDate] : 07/22 [FreeTextEntry4] : Extensive discussion today regarding prognosis and goals. I shared that patient's prognosis is likely months. He has advanced dementia and is currently dependent on all ADLs, including assistance with feeding. Goals for patient are conservative, favor no further lab draws and minimizing physician visits. Goals are to remain home with family. They are interested in pursuing hospice, which is appropriate. \par \par MOLST has previously been completed, patient DNR/DNI. Case was d/w patient's HCP, Venkatesh.

## 2022-07-28 NOTE — REASON FOR VISIT
[Follow-Up] : a follow-up visit [Family Member] : family member [FreeTextEntry3] : daughter, Venkatesh

## 2023-04-05 PROBLEM — F03.C0 SEVERE DEMENTIA: Status: ACTIVE | Noted: 2022-07-28

## 2023-04-24 NOTE — HISTORY OF PRESENT ILLNESS
You have been seen and evaluated. We discussed results of your visit. Please follow up with your PCP in the next 1-3 days to discuss this visit and any ongoing symptoms. Please try to keep track of your symptoms and any potential triggers. Continue to follow up with the mental health specialists that is already scheduled. Call the pediatric neurologist to further discuss your symptoms. Come back to ER immediately with any new or concerning symptoms, or if you have sudden or severe worsening of your pain. Thanks for coming to see us today. We hope you feel better.     [FreeTextEntry1] : 89 y/o man w/ PMH BPH, DM2, HTN, CKD III, hypothyroidism, presbycusis, OA, L carpal tunnel syndrome, rectal cancer (in remission) s/p proctectomy w/ colostomy, and prior lacunar CVA presents for f/u visit.\par \par Cards: Dr. Mohamud\par Neuropsych?: Dr. Vasquez\par \par Patient's first visit to our office in nearly 2 years, last 2020. He has had a couple phone visits in the interim. He has been receiving care from a [facility] of [facility], as per granddaughter, most recently Dr. Vasquez.\par \christina Came 20 minutes late to his appointment.\par \par In the interim 2 years, his wife  ~1.5 years ago. He was reportedly diagnosed with dementia in the interim. He is no longer ambulating. He has lost 20 pounds in the last two years. He spends most of the time in bed. He is now dependent on all ADLs. HHA has been feeding him. \par \christina Has had agitation, often tries to climb out of bed. Sometimes squeezes arms and hands of caregivers. \christina white Stopped seroquel after one dose, didn't feel well. Was started on trazodone 50 mg, which has helped with sleep. They have one 24/7 HHA who lives in. She has barely been sleeping prior to starting trazodone. Family has some financial constraints to private hire. \par \par Granddaughter today inquires about hospice.  [FreeTextEntry4] : Unclear if there is a HCP.\par \par Daughter, Flory, provides most care. However, there are two other sons as well.  ambulatory